# Patient Record
Sex: FEMALE | Race: WHITE | NOT HISPANIC OR LATINO | Employment: FULL TIME | URBAN - METROPOLITAN AREA
[De-identification: names, ages, dates, MRNs, and addresses within clinical notes are randomized per-mention and may not be internally consistent; named-entity substitution may affect disease eponyms.]

---

## 2022-04-04 ENCOUNTER — APPOINTMENT (OUTPATIENT)
Dept: PHYSICAL THERAPY | Age: 52
End: 2022-04-04

## 2022-04-04 PROCEDURE — 97530 THERAPEUTIC ACTIVITIES: CPT | Performed by: PHYSICAL THERAPIST

## 2022-05-06 ENCOUNTER — APPOINTMENT (OUTPATIENT)
Dept: URGENT CARE | Age: 52
End: 2022-05-06
Payer: OTHER MISCELLANEOUS

## 2022-05-06 PROCEDURE — 99283 EMERGENCY DEPT VISIT LOW MDM: CPT | Performed by: PHYSICIAN ASSISTANT

## 2022-05-06 PROCEDURE — G0382 LEV 3 HOSP TYPE B ED VISIT: HCPCS | Performed by: PHYSICIAN ASSISTANT

## 2022-05-12 ENCOUNTER — APPOINTMENT (OUTPATIENT)
Dept: URGENT CARE | Age: 52
End: 2022-05-12
Payer: OTHER MISCELLANEOUS

## 2022-05-12 PROCEDURE — 99215 OFFICE O/P EST HI 40 MIN: CPT | Performed by: PREVENTIVE MEDICINE

## 2023-12-27 ENCOUNTER — OFFICE VISIT (OUTPATIENT)
Dept: BEHAVIORAL/MENTAL HEALTH CLINIC | Facility: CLINIC | Age: 53
End: 2023-12-27

## 2023-12-27 DIAGNOSIS — F41.1 GAD (GENERALIZED ANXIETY DISORDER): Primary | ICD-10-CM

## 2023-12-27 PROCEDURE — H2021 COM WRAP-AROUND SV, 15 MIN: HCPCS

## 2023-12-27 NOTE — PROGRESS NOTES
Patient presents to [facility] requesting to obtain recommendations on how to handle a situation with her sister who is currently in a psychiatric hospital. Patient's sister lives in their mother's home who has since passed. Patient's sister is reportedly not taking care of the home and living in [facility]. Patient's sister has several animals, including two pot-bellied pigs she keeps in the garage. [employer] has been contacted and are involved with this along with police. Patient also consulted with a  in regard to handling the situation. Patient is  of the estate. Patient is carrying guilt and unsure if she should evict her sister or not. Patient and [facility] discussed different ways to go about handling her sister due to her mental health diagnoses. Patient then disclosed that she lost her son several years ago from a suicide that she witnessed. Patient has been in grief counseling, groups, and saw four different trauma therapists. Patient has no current services. [facility] strongly advised for this patient to obtain services. [facility] offered to help set up services and requested a crisis intake. Patient was not agreeable and stated she was only here for her sister. Patient not willing to accept help or services at this time.

## 2024-01-02 ENCOUNTER — TELEPHONE (OUTPATIENT)
Dept: BEHAVIORAL/MENTAL HEALTH CLINIC | Facility: CLINIC | Age: 54
End: 2024-01-02

## 2024-01-02 NOTE — TELEPHONE ENCOUNTER
Telephone call to PT as follow up from [facility] visit. PT states she came to the [facility] for advice on how to handle a situation with her sister. PT reports she is evicting her sister from the home. PT states the [employer] did come and take the animals as well.     Provided education and telephone number for [place] [facility] [employer] ([phone]).     PT declined services for herself at this time.     PT thanked [facility] for follow up and denies needing linkage to any services at this time.